# Patient Record
Sex: FEMALE | NOT HISPANIC OR LATINO | URBAN - METROPOLITAN AREA
[De-identification: names, ages, dates, MRNs, and addresses within clinical notes are randomized per-mention and may not be internally consistent; named-entity substitution may affect disease eponyms.]

---

## 2018-11-01 ENCOUNTER — OFFICE VISIT - HEALTHEAST (OUTPATIENT)
Dept: PODIATRY | Facility: CLINIC | Age: 33
End: 2018-11-01

## 2018-11-01 ENCOUNTER — AMBULATORY - HEALTHEAST (OUTPATIENT)
Dept: PODIATRY | Facility: CLINIC | Age: 33
End: 2018-11-01

## 2018-11-01 DIAGNOSIS — M21.6X2 PRONATION DEFORMITY OF BOTH FEET: ICD-10-CM

## 2018-11-01 DIAGNOSIS — M21.6X1 PRONATION DEFORMITY OF BOTH FEET: ICD-10-CM

## 2018-11-01 RX ORDER — PROPAFENONE HYDROCHLORIDE 225 MG/1
225 TABLET, FILM COATED ORAL
Status: SHIPPED | COMMUNITY
Start: 2018-04-03

## 2018-11-01 RX ORDER — GLYCOPYRROLATE 1 MG/1
1 TABLET ORAL
Status: SHIPPED | COMMUNITY
Start: 2014-06-26

## 2018-11-01 RX ORDER — METOPROLOL SUCCINATE 25 MG/1
50 TABLET, EXTENDED RELEASE ORAL
Status: SHIPPED | COMMUNITY
Start: 2018-11-01

## 2018-11-01 RX ORDER — VENLAFAXINE HYDROCHLORIDE 37.5 MG/1
37.5 TABLET, EXTENDED RELEASE ORAL
Status: SHIPPED | COMMUNITY
Start: 2018-11-01

## 2018-11-01 RX ORDER — GLYCOPYRROLATE 2 MG/1
2 TABLET ORAL
Status: SHIPPED | COMMUNITY
Start: 2014-12-23

## 2018-11-01 ASSESSMENT — MIFFLIN-ST. JEOR: SCORE: 1518.69

## 2018-11-08 ENCOUNTER — COMMUNICATION - HEALTHEAST (OUTPATIENT)
Dept: OTHER | Facility: CLINIC | Age: 33
End: 2018-11-08

## 2018-12-07 ENCOUNTER — AMBULATORY - HEALTHEAST (OUTPATIENT)
Dept: OTHER | Facility: CLINIC | Age: 33
End: 2018-12-07

## 2018-12-21 ENCOUNTER — AMBULATORY - HEALTHEAST (OUTPATIENT)
Dept: OTHER | Facility: CLINIC | Age: 33
End: 2018-12-21

## 2021-06-02 VITALS — BODY MASS INDEX: 23.62 KG/M2 | HEIGHT: 70 IN | WEIGHT: 165 LBS

## 2021-06-21 NOTE — PROGRESS NOTES
Admission History & Physical  Patti Esqueda, 1985, [id number]    [facility]  Corby Feliciano MD, [phone]    Extended Emergency Contact Information  Primary Emergency Contact: Nia Esqueda   [place]  Mobile Phone: [phone]  Relation: Mother     Assessment and Plan:   Assessment: Pronation deformity bilateral feet  Plan: I have recommended new orthotics  Active Problems:    * No active hospital problems. *      Chief Complaint:  Patient requesting new orthotics     HPI:    Patti Esqueda is a 33 y.o. old female who presented to the clinic today requesting new orthotics.  The patient has had multiple bilateral foot surgeries.  She has had lateral ankle stabilization procedure performed following a significant injury to her left ankle. She has had no recent pain.  She stated that if she does not have orthotics she does have significant foot pain.  She has been worn orthotics most of her life.  History is provided by patient    Medical History  There are no active non-hospital problems to display for this patient.    Past Medical History:   Diagnosis Date     Bunion      Callus      Hammer toe      There are no active problems to display for this patient.    Surgical History  She  has no past surgical history on file.   History reviewed. No pertinent surgical history. Social History  Reviewed, and she  reports that she has never smoked. She has never used smokeless tobacco.  Social History   Substance Use Topics     Smoking status: Never Smoker     Smokeless tobacco: Never Used     Alcohol use Not on file      Allergies  No Known Allergies Family History  Reviewed, and family history is not on file.   Psychosocial Needs  Social History     Social History Narrative     No narrative on file     Additional psychosocial needs reviewed per nursing assessment.       Prior to Admission Medications     (Not in a hospital admission)        Review of Systems - Negative     BP 90/50  Pulse  "65  Ht 5' 10\" (1.778 m)  Wt 165 lb (74.8 kg)  SpO2 98%  BMI 23.68 kg/m2    Objective findings: General: The patient is alert and in no acute distress      Integument: Nails bilateral feet are elongated but normal color and normal thickness.  Skin bilaterally warm supple and intact.  There are multiple well-healed surgical incisions bilateral feet and the lateral aspect of the left ankle.    Vascular: DP and PT pulses palpable bilaterally.  Capillary refill less than 2 seconds bilaterally.      Neurologic: Negative clonus, negative Babinski bilateral feet.        Musculoskeletal: Range of motion within normal limits bilateral feet.  Muscle power +5/5 bilaterally in all compartments.  There is mild flattening of the medial longitudinal arch noted bilaterally.      Assessment: Pronation deformity bilateral feet        Plan: I have recommended new orthotics.  "

## 2021-06-22 NOTE — PROGRESS NOTES
"S: Pt. seen in [place] office for F/D of [place] FO. Female. 5;10\", 165 lbs., Dr. Gore. RX: Custom FO, DX: Pronation deformity of both feet.   O: Pt. is presently wearing Custom FO. Due to wear Pt. is in need of new. Pt. had reconstructive surgery on bilateral feet at age 12. Recently she had surgery on HDS and Bunions.   A: Pain 2/10. Pes cavus feet. Hind foot valgus, Fore foot neutral. ROM within norm.. 1st ray flexible. Heel rise inverted. Callus formation bilaterally 1 and 5 MTH's plantar.  P: Today I F/D Custom [place] FO. FO provide lift and support to medial long arches. Straighten ankle valgus. Met bar offload MTH's. Pt. stated that FO felt good and the Met pad offloaded her MTH's and reduced pain in that area. FO where not fitted to shoes because she did not bring her sneakers. Donjamar doffing wear and care instruction's along with break in schedule given. Pt. to be seen as needed.    Rory Patel CO,LO  "

## 2021-06-22 NOTE — PROGRESS NOTES
"S: Pt. seen in [place] office. Female. 5;10\", 165 lbs., Dr. Gore. RX: Custom FO, DX: Pronation deformity of both feet.   O: Pt. is presently wearing Custom FO. Due to wear Pt. is in need of new. Pt.ad reconstructive surgery on bilateral feet at age 12. Lately she had surgery on HDS. and Bunions.   A: Pain 2/10. Pes cavus feet. Hind foot valgus, Fore foot neutral. ROM within norm.1st ray flexible. Heel rise inverted. Callus formation bilaterally 1 and 5 MTH's plantar.  P: Today I C/M with bio foam for Custom [place] FO. FO to provide lift and support to medial long arches. Straighten ankle valgus. Met bar to offload MTH's.  Pt. to be seen for F/D.    Rory SERRATO,LO  "

## 2022-03-29 ENCOUNTER — APPOINTMENT (RX ONLY)
Dept: URBAN - METROPOLITAN AREA CLINIC 96 | Facility: CLINIC | Age: 37
Setting detail: DERMATOLOGY
End: 2022-03-29

## 2022-03-29 DIAGNOSIS — L81.4 OTHER MELANIN HYPERPIGMENTATION: ICD-10-CM

## 2022-03-29 DIAGNOSIS — D18.0 HEMANGIOMA: ICD-10-CM

## 2022-03-29 DIAGNOSIS — L82.1 OTHER SEBORRHEIC KERATOSIS: ICD-10-CM

## 2022-03-29 DIAGNOSIS — L72.8 OTHER FOLLICULAR CYSTS OF THE SKIN AND SUBCUTANEOUS TISSUE: ICD-10-CM

## 2022-03-29 DIAGNOSIS — Z87.2 PERSONAL HISTORY OF DISEASES OF THE SKIN AND SUBCUTANEOUS TISSUE: ICD-10-CM

## 2022-03-29 DIAGNOSIS — D22 MELANOCYTIC NEVI: ICD-10-CM

## 2022-03-29 PROBLEM — D22.5 MELANOCYTIC NEVI OF TRUNK: Status: ACTIVE | Noted: 2022-03-29

## 2022-03-29 PROBLEM — D18.01 HEMANGIOMA OF SKIN AND SUBCUTANEOUS TISSUE: Status: ACTIVE | Noted: 2022-03-29

## 2022-03-29 PROCEDURE — ? DEFER

## 2022-03-29 PROCEDURE — ? ADDITIONAL NOTES

## 2022-03-29 PROCEDURE — 99203 OFFICE O/P NEW LOW 30 MIN: CPT

## 2022-03-29 PROCEDURE — ? OBSERVATION AND MEASURE

## 2022-03-29 PROCEDURE — ? COUNSELING

## 2022-03-29 PROCEDURE — ? PHOTO-DOCUMENTATION

## 2022-03-29 ASSESSMENT — LOCATION DETAILED DESCRIPTION DERM
LOCATION DETAILED: LEFT DISTAL DORSAL FOREARM
LOCATION DETAILED: RIGHT LATERAL SUPERIOR CHEST
LOCATION DETAILED: RIGHT MEDIAL SUPERIOR CHEST
LOCATION DETAILED: RIGHT PROXIMAL POSTERIOR UPPER ARM
LOCATION DETAILED: RIGHT PROXIMAL DORSAL FOREARM
LOCATION DETAILED: EPIGASTRIC SKIN

## 2022-03-29 ASSESSMENT — LOCATION SIMPLE DESCRIPTION DERM
LOCATION SIMPLE: ABDOMEN
LOCATION SIMPLE: CHEST
LOCATION SIMPLE: RIGHT POSTERIOR UPPER ARM
LOCATION SIMPLE: LEFT FOREARM
LOCATION SIMPLE: RIGHT FOREARM

## 2022-03-29 ASSESSMENT — LOCATION ZONE DERM
LOCATION ZONE: ARM
LOCATION ZONE: TRUNK

## 2022-03-29 NOTE — PROCEDURE: ADDITIONAL NOTES
Render Risk Assessment In Note?: no
Additional Notes: Dermatoscope used during exam
Detail Level: Simple